# Patient Record
Sex: FEMALE | Race: WHITE | NOT HISPANIC OR LATINO | URBAN - METROPOLITAN AREA
[De-identification: names, ages, dates, MRNs, and addresses within clinical notes are randomized per-mention and may not be internally consistent; named-entity substitution may affect disease eponyms.]

---

## 2019-01-31 ENCOUNTER — HISTORICAL (OUTPATIENT)
Dept: ADMINISTRATIVE | Facility: HOSPITAL | Age: 69
End: 2019-01-31

## 2019-01-31 LAB
ALBUMIN SERPL-MCNC: 4.6 GM/DL (ref 3.4–5)
ALBUMIN/GLOB SERPL: 2.09 {RATIO} (ref 1.5–2.5)
ALP SERPL-CCNC: 74 UNIT/L (ref 38–126)
ALT SERPL-CCNC: 27 UNIT/L (ref 7–52)
AST SERPL-CCNC: 18 UNIT/L (ref 15–37)
BILIRUB SERPL-MCNC: 0.4 MG/DL (ref 0.2–1)
BILIRUBIN DIRECT+TOT PNL SERPL-MCNC: 0.1 MG/DL (ref 0–0.5)
BILIRUBIN DIRECT+TOT PNL SERPL-MCNC: 0.3 MG/DL
BUN SERPL-MCNC: 18 MG/DL (ref 7–18)
CALCIUM SERPL-MCNC: 9.6 MG/DL (ref 8.5–10)
CHLORIDE SERPL-SCNC: 104 MMOL/L (ref 98–107)
CHOLEST SERPL-MCNC: 175 MG/DL (ref 0–200)
CHOLEST/HDLC SERPL: 3.4 {RATIO}
CO2 SERPL-SCNC: 32 MMOL/L (ref 21–32)
CREAT SERPL-MCNC: 0.86 MG/DL (ref 0.6–1.3)
EST. AVERAGE GLUCOSE BLD GHB EST-MCNC: 174 MG/DL
GLOBULIN SER-MCNC: 2.2 GM/DL (ref 1.2–3)
GLUCOSE SERPL-MCNC: 198 MG/DL (ref 74–106)
HBA1C MFR BLD: 7.7 % (ref 4.4–6.4)
HDLC SERPL-MCNC: 51 MG/DL (ref 35–60)
LDLC SERPL CALC-MCNC: 82 MG/DL (ref 0–129)
POTASSIUM SERPL-SCNC: 5.4 MMOL/L (ref 3.5–5.1)
PROT SERPL-MCNC: 6.8 GM/DL (ref 6.4–8.2)
SODIUM SERPL-SCNC: 143 MMOL/L (ref 136–145)
TRIGL SERPL-MCNC: 196 MG/DL (ref 30–150)
VLDLC SERPL CALC-MCNC: 39.2 MG/DL

## 2019-02-18 ENCOUNTER — HISTORICAL (OUTPATIENT)
Dept: ADMINISTRATIVE | Facility: HOSPITAL | Age: 69
End: 2019-02-18

## 2019-02-18 LAB
ALBUMIN SERPL-MCNC: 4.4 GM/DL (ref 3.4–5)
ALBUMIN/GLOB SERPL: 2.1 {RATIO} (ref 1.5–2.5)
ALP SERPL-CCNC: 74 UNIT/L (ref 38–126)
ALT SERPL-CCNC: 25 UNIT/L (ref 7–52)
AST SERPL-CCNC: 16 UNIT/L (ref 15–37)
BILIRUB SERPL-MCNC: 0.4 MG/DL (ref 0.2–1)
BILIRUBIN DIRECT+TOT PNL SERPL-MCNC: 0.1 MG/DL (ref 0–0.5)
BILIRUBIN DIRECT+TOT PNL SERPL-MCNC: 0.3 MG/DL
BUN SERPL-MCNC: 17 MG/DL (ref 7–18)
CALCIUM SERPL-MCNC: 9.3 MG/DL (ref 8.5–10)
CHLORIDE SERPL-SCNC: 103 MMOL/L (ref 98–107)
CHOLEST SERPL-MCNC: 191 MG/DL (ref 0–200)
CHOLEST/HDLC SERPL: 3.8 {RATIO}
CO2 SERPL-SCNC: 29 MMOL/L (ref 21–32)
CREAT SERPL-MCNC: 0.87 MG/DL (ref 0.6–1.3)
GLOBULIN SER-MCNC: 2 GM/DL (ref 1.2–3)
GLUCOSE SERPL-MCNC: 169 MG/DL (ref 74–106)
HDLC SERPL-MCNC: 50 MG/DL (ref 35–60)
LDLC SERPL CALC-MCNC: 88 MG/DL (ref 0–129)
POTASSIUM SERPL-SCNC: 4.4 MMOL/L (ref 3.5–5.1)
PROT SERPL-MCNC: 6.5 GM/DL (ref 6.4–8.2)
SODIUM SERPL-SCNC: 140 MMOL/L (ref 136–145)
TRIGL SERPL-MCNC: 207 MG/DL (ref 30–150)
VLDLC SERPL CALC-MCNC: 41.4 MG/DL

## 2022-04-07 ENCOUNTER — HISTORICAL (OUTPATIENT)
Dept: ADMINISTRATIVE | Facility: HOSPITAL | Age: 72
End: 2022-04-07

## 2022-04-23 VITALS
SYSTOLIC BLOOD PRESSURE: 122 MMHG | BODY MASS INDEX: 32.88 KG/M2 | HEIGHT: 69 IN | DIASTOLIC BLOOD PRESSURE: 78 MMHG | WEIGHT: 222 LBS

## 2022-05-01 NOTE — HISTORICAL [facility] CERNER
This is a historical note converted from Natalie. Formatting and pictures may have been removed.  Please reference Natalie for original formatting and attached multimedia. Chief Complaint  6 month F/U  History of Present Illness  Patient is here today for 6-month recheck diabetes, hypercholesterolemia,?hypertension, and depression.? She is tolerating all medications without side effects.? She is not exercising regularly however did do her stepping class yesterday.? She reports her blood sugars have been higher than normal around 200?despite taking her medication regularly.? She did?overeat during the holidays.? She has gained about 6 pounds over the last 6 months.? Her diabetic eye exam is up-to-date.? She also had a cardiology visit?and no abnormalities were found.  Review of Systems  General:???+ ?[id number]?_  HEENT:???novision changes,? dm eye exam?less than 1 yr ago?getb vreport from Dr Sears  CARDIAC:?no?chest pain, SOB,  GI:?no?diarrhea,?no?n&v  ENDOCRINE:?nopolyuria,no?polydipsia,?no?polyphagia  EXT:?no?signs of neuropathy, + numbness tingling and pain to left lateral upper arm  Mood: depression stable on cymbalta  Physical Exam  Vitals & Measurements  HR:?78(Peripheral)? BP:?122/78?  HT:?175?cm? WT:?100.7?kg? BMI:?32.88?  GENERAL:?? alert and oriented x4  HEENT:? PERRLA, mouth and throat clear, mucous membranes moist  CHEST:? CTA bilaterally  CARDIAC:? RRR no murmurs audible  EXT:?no? ?edema to lower extremities? ?bilaterally,? Dm foot exam today see form.  Assessment/Plan  1.?Diabetes mellitus?E11.9  ?last A1C 6.4, check A1C, FLP, CMP,??microalbumin wnl 7/18, continue metformin 1000 bid, ?DM eye exam -get report,? Dm foot exam today. ?Recommend try?lose it kenneth to help keep track of calories. ?Encourage patient to increase exercise and attempt to obtain 150 minutes of moderate aerobic exercise?weekly  Ordered:  Clinic Follow up, *Est. 07/31/19 3:00:00 CDT, Order for future visit, Diabetes mellitus   Hypercholesterolemia  HTN (hypertension)  Depression, [facility]  Comprehensive Metabolic Panel, Routine collect, 01/31/19 9:07:00 CST, Blood, Order for future visit, Stop date 01/31/19 9:07:00 CST, Lab Collect, Diabetes mellitus  HTN (hypertension), 01/31/19 9:07:00 CST  Hemoglobin A1c, Routine collect, 01/31/19 9:07:00 CST, Blood, Order for future visit, Stop date 01/31/19 9:07:00 CST, Lab Collect, Diabetes mellitus, 01/31/19 9:07:00 CST  Lab Collection Request, 01/31/19 9:07:00 CST, [facility] AMB - AFP, 01/31/19 9:07:00 CST  Office/Outpatient Visit Level 4 Established 00877 PC, Diabetes mellitus  Hypercholesterolemia  HTN (hypertension)  Depression, [facility] - AFP, 01/31/19 9:07:00 CST  ?  2.?Hypercholesterolemia?E78.00  Stable,?continue atorvastatin?80 mg  Ordered:  Clinic Follow up, *Est. 07/31/19 3:00:00 CDT, Order for future visit, Diabetes mellitus  Hypercholesterolemia  HTN (hypertension)  Depression, [facility]  Lipid Panel, Routine collect, 01/31/19 9:07:00 CST, Blood, Order for future visit, Stop date 01/31/19 9:07:00 CST, Lab Collect, Hypercholesterolemia, 01/31/19 9:07:00 CST  Office/Outpatient Visit Level 4 Established 32258 PC, Diabetes mellitus  Hypercholesterolemia  HTN (hypertension)  Depression, [facility] - AFP, 01/31/19 9:07:00 CST  ?  3.?HTN (hypertension)?I10  Stable,?continue HCTZ 25 mg  Ordered:  Clinic Follow up, *Est. 07/31/19 3:00:00 CDT, Order for future visit, Diabetes mellitus  Hypercholesterolemia  HTN (hypertension)  Depression, [facility]  Comprehensive Metabolic Panel, Routine collect, 01/31/19 9:07:00 CST, Blood, Order for future visit, Stop date 01/31/19 9:07:00 CST, Lab Collect, Diabetes mellitus  HTN (hypertension), 01/31/19 9:07:00 CST  Office/Outpatient Visit Level 4 Established 93781 PC, Diabetes mellitus  Hypercholesterolemia  HTN (hypertension)  Depression, [facility] - AFP, 01/31/19 9:07:00 CST  ?  4.?Depression?F32.9  Stable,?continue cymbalta 60  mg  Ordered:  Clinic Follow up, *Est. 07/31/19 3:00:00 CDT, Order for future visit, Diabetes mellitus  Hypercholesterolemia  HTN (hypertension)  Depression, [facility]  Office/Outpatient Visit Level 4 Established 39559 PC, Diabetes mellitus  Hypercholesterolemia  HTN (hypertension)  Depression, [facility] - AFP, 01/31/19 9:07:00 CST  ?  5.?Asthma?J45.909  Uses meds as needed only?( symbicort 2 puffs BId, and prn albuterol.)  ?  6.?Immunization due?Z23  ?pneumovax today (1st in 2014)  Ordered:  pneumococcal 23-polyvalent vaccine, 0.5 mL, IM, Once-Unscheduled, first dose 01/31/19 9:07:00 CST  ?  Orders:  aspirin, 81 mg = 1 tab(s), Oral, Daily, # 30 tab(s), 11 Refill(s), other reason (Rx)  atorvastatin, 80 mg = 1 tab(s), Oral, qPM, # 90 tab(s), 2 Refill(s), Pharmacy: [facility]  carvedilol, 6.25 mg = 1 tab(s), Oral, BID, # 180 tab(s), 2 Refill(s), Pharmacy: [facility]  hydrochlorothiazide, See Instructions, TAKE 1 TABLET DAILY, # 90 tab(s), 2 Refill(s), Pharmacy: [facility]  levocetirizine, 5 mg = 1 tab(s), Oral, qPM, # 90 tab(s), 3 Refill(s), Pharmacy: [facility]  metFORMIN, 1,000 mg = 1 tab(s), Oral, BID, # 180 tab(s), 2 Refill(s), Pharmacy: [facility]  pantoprazole, See Instructions, TAKE 1 TABLET DAILY, # 90 abs, 2 Refill(s), Pharmacy: [facility]  Set up Dexa scan  ?  3x   Problem List/Past Medical History  Ongoing  Asthma  Depression  Depression  Diabetes mellitus  Exertional dyspnea  GERD - Gastro-esophageal reflux disease  HTN (hypertension)  Hypercholesterolemia  Hypertriglyceridemia  Immunization due  Wellness examination  Historical  Asthma  Carpal tunnel syndrome of left wrist  Carpal tunnel syndrome of right wrist  Diabetes mellitus  GERD - Gastro-esophageal reflux disease  Glaucoma  Hypertriglyceridemia  Sleep apnea  Procedure/Surgical History  Mammogram  (06/27/2018)  Colonoscopy (05/27/2016)  LASIK (2012)  Release of carpal tunnel for median nerve decompression (2012)  Release of carpal tunnel for median nerve decompression-LEFT (2012)  Bilateral extraction of cataracts (2010)  Excision of ganglion cyst-RIGHT GREAT TOE  Liposuction  Total hysterectomy   Medications  aspirin 81 mg oral tablet, 81 mg= 1 tab(s), Oral, Daily, 11 refills  atorvastatin 80 mg oral tablet, 80 mg= 1 tab(s), Oral, qPM, 2 refills  carvedilol 6.25 mg oral tablet, 6.25 mg= 1 tab(s), Oral, BID, 2 refills  DORZOL/TIMOL SOL 22.3-6.8, 1 drop(s), OPTH, BID  DULoxetine 60 mg oral delayed release capsule, See Instructions, 1 refills  Flonase 50 mcg/inh nasal spray, 50 mcg= 1 spray(s), Both Nostrils, BID, 11 refills  hydrochlorothiazide 25 mg oral tablet, See Instructions, 2 refills  LATANOPROST SOL 0.005%  levocetirizine 5 mg oral tablet, 5 mg= 1 tab(s), Oral, qPM, 3 refills  meloxicam 15 mg oral tablet, See Instructions, 1 refills  metFORMIN 1000 mg oral tablet, 1000 mg= 1 tab(s), Oral, BID, 2 refills  ONE TOUCH ULTRA TEST STRIPS, See Instructions, 11 refills  Pantoprazole 40 mg ORAL EC-Tablet, See Instructions, 2 refills  Pneumovax 23, 0.5 mL, IM, Once-Unscheduled  Restasis 0.05% ophthalmic emulsion, 1 drop(s), Eye-Both, q12hr  Symbicort 160 mcg-4.5 mcg/inh inhalation aerosol, 2 puff(s), INH, BID  Ventolin HFA 90 mcg/inh inhalation aerosol, 1 puff(s), INH, Once, PRN  Vitamin B12  Vitamin D3  Allergies  Singulair?(Itch)  Social History  Alcohol  Current, 1-2 times per week, 07/30/2018  Employment/School  Work/School description: HOUSEWIFE., 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  Myeloma: Father.  Peripheral vascular disease.: Mother.  Primary malignant neoplasm of prostate: Brother.  Immunizations  Vaccine Date Status   influenza  virus vaccine, inactivated 10/04/2018 Given   pneumococcal 13-valent conjugate vaccine 04/30/2015 Recorded   tetanus/diphth/pertuss (Tdap) adult/adol 10/02/2014 Recorded   pneumococcal 23-polyvalent vaccine 04/23/2014 Recorded   zoster vaccine live 04/19/2013 Recorded   Health Maintenance  Health Maintenance  ???Pending?(in the next year)  ??? ??OverDue  ??? ? ? ?Pneumococcal Vaccine due??and every?  ??? ??Due?  ??? ? ? ?ADL Screening due??01/31/19??and every 1??year(s)  ??? ? ? ?Advance Directive due??01/31/19??and every 1??year(s)  ??? ? ? ?Alcohol Misuse Screening due??01/31/19??and every 1??year(s)  ??? ? ? ?Asthma Management-Asthma Education due??01/31/19??and every 6??month(s)  ??? ? ? ?Asthma Management-Spirometry due??01/31/19??Variable frequency  ??? ? ? ?Asthma Management-[place] Peak Flow due??01/31/19??Variable frequency  ??? ? ? ?Asthma Management-Written Action Plan due??01/31/19??and every 6??month(s)  ??? ? ? ?Bone Density Screening due??01/31/19??Variable frequency  ??? ? ? ?Cognitive Screening due??01/31/19??and every 1??year(s)  ??? ? ? ?Diabetes Maintenance-Eye Exam due??01/31/19??and every?  ??? ? ? ?Fall Risk Assessment due??01/31/19??and every 1??year(s)  ??? ? ? ?Functional Assessment due??01/31/19??and every 1??year(s)  ??? ? ? ?Geriatric Depression Screening due??01/31/19??and every 1??year(s)  ??? ? ? ?Hypertension Management-Education due??01/31/19??and every 1??year(s)  ??? ??Due In Future?  ??? ? ? ?Diabetes Maintenance-Fasting Lipid Profile not due until??07/30/19??and every 1??year(s)  ??? ? ? ?Diabetes Maintenance-HgbA1c not due until??07/30/19??and every 1??year(s)  ??? ? ? ?Hypertension Management-BMP not due until??07/30/19??and every 1??year(s)  ??? ? ? ?Diabetes Maintenance-Microalbumin not due until??07/30/19??and every 1??year(s)  ??? ? ? ?Diabetes Maintenance-Serum Creatinine not due until??07/30/19??and every 1??year(s)  ??? ? ? ?Diabetes Maintenance-Urine Dipstick not due  until??07/30/19??and every 1??year(s)  ???Satisfied?(in the past 1 year)  ??? ??Satisfied?  ??? ? ? ?Aspirin Therapy for CVD Prevention on??01/31/19.??Satisfied by Eduin Thompson MD  ??? ? ? ?Asthma Management-Asthma Medication Prescribed on??07/25/18.  ??? ? ? ?Blood Pressure Screening on??01/31/19.??Satisfied by Yelitza Montez CMA  ??? ? ? ?Body Mass Index Check on??01/31/19.??Satisfied by Yelitza Montez CMA  ??? ? ? ?Breast Cancer Screening ([facility]) on??06/27/18.??Satisfied by Sivan Lawson CMA.  ??? ? ? ?Diabetes Maintenance-Foot Exam on??01/31/19.??Satisfied by Eduin Thompson MD  ??? ? ? ?Diabetes Screening on??07/30/18.??Satisfied by Evita Lawson  ??? ? ? ?Hypertension Management-Blood Pressure on??01/31/19.??Satisfied by Yelitza Montez CMA  ??? ? ? ?Influenza Vaccine on??10/04/18.??Satisfied by Radha Anne CMA.  ??? ? ? ?Lipid Screening on??07/30/18.??Satisfied by Evita Lawson  ??? ? ? ?Obesity Screening on??01/31/19.??Satisfied by Yelitza Montez CMA  ??? ??Canceled?  ??? ? ? ?Smoking Cessation on??01/30/19.?Recorded by Eduin Thompson MD?Reason: Expectation Not Necessary  ?  ?